# Patient Record
Sex: FEMALE | Race: BLACK OR AFRICAN AMERICAN | NOT HISPANIC OR LATINO
[De-identification: names, ages, dates, MRNs, and addresses within clinical notes are randomized per-mention and may not be internally consistent; named-entity substitution may affect disease eponyms.]

---

## 2019-06-03 ENCOUNTER — APPOINTMENT (OUTPATIENT)
Dept: SURGERY | Facility: CLINIC | Age: 59
End: 2019-06-03
Payer: COMMERCIAL

## 2019-06-03 VITALS
SYSTOLIC BLOOD PRESSURE: 137 MMHG | HEART RATE: 72 BPM | BODY MASS INDEX: 27.72 KG/M2 | HEIGHT: 71 IN | TEMPERATURE: 98.3 F | DIASTOLIC BLOOD PRESSURE: 87 MMHG | WEIGHT: 198 LBS

## 2019-06-03 DIAGNOSIS — Z83.3 FAMILY HISTORY OF DIABETES MELLITUS: ICD-10-CM

## 2019-06-03 DIAGNOSIS — Z78.9 OTHER SPECIFIED HEALTH STATUS: ICD-10-CM

## 2019-06-03 DIAGNOSIS — Z82.49 FAMILY HISTORY OF ISCHEMIC HEART DISEASE AND OTHER DISEASES OF THE CIRCULATORY SYSTEM: ICD-10-CM

## 2019-06-03 DIAGNOSIS — Z86.79 PERSONAL HISTORY OF OTHER DISEASES OF THE CIRCULATORY SYSTEM: ICD-10-CM

## 2019-06-03 DIAGNOSIS — N63.20 UNSPECIFIED LUMP IN THE LEFT BREAST, UNSPECIFIED QUADRANT: ICD-10-CM

## 2019-06-03 PROCEDURE — 99203 OFFICE O/P NEW LOW 30 MIN: CPT | Mod: 25

## 2019-06-03 PROCEDURE — 10021 FNA BX W/O IMG GDN 1ST LES: CPT

## 2019-06-03 NOTE — PHYSICAL EXAM
[No Rash or Lesion] : No rash or lesion [Normal Rate and Rhythm] : normal rate and rhythm [Normal Breath Sounds] : Normal breath sounds [Oriented to Person] : oriented to person [Alert] : alert [Oriented to Place] : oriented to place [Calm] : calm [Oriented to Time] : oriented to time [de-identified] : A/Ox3; NAD [de-identified] : EOMI [de-identified] : supple, no JVD [de-identified] : has a cystic mass to the L. breast, non tender, no nipple discharge, no masses or lumps felt to R. breast, no axillary adenopathy  [de-identified] : abd is soft, NT/ND [de-identified] : no LE Edema ; no erythema  [de-identified] : h

## 2019-06-03 NOTE — DATA REVIEWED
[FreeTextEntry1] : Patient: KHANH NANCE\par YOB: 1960\par Phone: [phone]\par MRN: [id number] Acc: [id number]\par Date of Exam: 10-\par  \par EXAM:  DIGITAL UNILATERAL LEFT DIAGNOSTIC MAMMOGRAM WITH CAD AND BREAST ULTRASOUND\par \par HISTORY:  The patient is 58 years old and is seen for evaluation of nodule upper outer left breast. There is no personal history of breast cancer. Family history of breast cancer: None.\par \par CLINICAL BREAST EXAMINATION:  The patient reports CBE was more than one year ago.\par \par COMPARISON:  The present examination has been compared to prior breast imaging studies dating back to 9/22/2000\par \par MAMMOGRAM:\par \par TECHNIQUE:  Full-field digital mammography of the left breast was obtained. Spot compression view left breast were obtained in CC and MLO projection. Straight lateral view and exaggerated CC view left breast were also obtained. Computer-assisted detection (CAD) was utilized. \par \par FINDINGS:\par BREAST COMPOSITION:  The breasts are heterogeneously dense, which may obscure small masses.\par \par There is a 2.6 cm round hyperdense nodule in the anterior upper outer left breast. There is an adjacent biopsy marker. The focal asymmetry in the left retroareolar region effaces on spot compression.    \par \par ULTRASOUND:  \par \par TECHNIQUE:  A left breast ultrasound was performed limited to the area of interest.\par \par FINDINGS:  In the left breast at 1 o'clock 4 cm from the nipple there is a large 2.5 x 2.4 x 1.8 cm anechoic nodule consistent with a cyst. There were no nodule seen in the left retroareolar region sonographically.\par \par IMPRESSION: 2.6 cm round hypodense nodule seen in the anterior upper outer left breast. Ultrasound shows a corresponding 2.5 cm anechoic nodule consistent with a cyst.   \par \par FOLLOW-UP:  Clinical correlation and assessment.\par Any suspicious palpable abnormality should be managed based on clinical grounds with surgical consultation. Otherwise follow-up in 1 year recommended\par ASSESSMENT:  BI-RADS Category 2:  Benign.\par \par \par \par \par As per the FDA requirements, a layman's letter has been generated and sent to your patient stating the results and recommendations of this breast imaging study. We have entered your patient into our reminder system and will notify them when they are due for their next breast imaging exam. \par Thank you for the opportunity to participate in the care of this patient.  \par  \par Farrukh High MD  - Electronically Signed: 10- 12:49 PM \par Physician to Physician Direct Line is: [phone]\par

## 2019-06-03 NOTE — HISTORY OF PRESENT ILLNESS
[de-identified] : 58 y.o F referred for consultation visit, she c/o having a L. breast nodule. Patient had mammo and breast US 10/31/18 ; results c/w 2.6 cm round hypodense nodule seen in the anterior upper outer left breast. Ultrasound shows a corresponding 2.5 cm anechoic nodule consistent with a cyst.  BI RADS cat 2. \par Patient reports feeling a lump on her left breast, denies pain to the area; she states she's felt the lump for years.  [de-identified] : Under [facility] Fine needle aspiration of the left upper breast cystic mass was done. Tolearted th eprocedure well\par No residual mass

## 2019-06-03 NOTE — CONSULT LETTER
[Dear  ___] : Dear  [unfilled], [Consult Letter:] : I had the pleasure of evaluating your patient, [unfilled]. [Consult Closing:] : Thank you very much for allowing me to participate in the care of this patient.  If you have any questions, please do not hesitate to contact me. [Sincerely,] : Sincerely, [FreeTextEntry3] : Venancio Jorgensen MD\par

## 2019-06-03 NOTE — REVIEW OF SYSTEMS
[Fever] : no fever [Chills] : no chills [Feeling Poorly] : not feeling poorly [Eyesight Problems] : no eyesight problems [Nosebleeds] : no nosebleeds [Chest Pain] : no chest pain [Shortness Of Breath] : no shortness of breath [Wheezing] : no wheezing [Abdominal Pain] : no abdominal pain [Joint Swelling] : no joint swelling [Pelvic Pain] : no pelvic pain [Breast Pain] : no breast pain [Joint Stiffness] : no joint stiffness [Anxiety] : no anxiety [Dizziness] : no dizziness [Muscle Weakness] : no muscle weakness [Swollen Glands] : no swollen glands [de-identified] : has a lump to the L. breast , for years

## 2019-06-03 NOTE — PLAN
[FreeTextEntry1] : Under [facility] fine needle aspiration bx was done for the left breast cystic mass in the left upper outer quadrant\par Tolerated the procedure well\par No residual mass felt\par Mammogram and breast Us was reviewed

## 2019-06-05 PROBLEM — Z82.49 FAMILY HISTORY OF HYPERTENSION: Status: ACTIVE | Noted: 2019-06-03

## 2019-06-05 PROBLEM — Z83.3 FAMILY HISTORY OF DIABETES MELLITUS: Status: ACTIVE | Noted: 2019-06-03

## 2019-06-05 PROBLEM — Z78.9 NO PERTINENT PAST SURGICAL HISTORY: Status: RESOLVED | Noted: 2019-06-03 | Resolved: 2019-06-05

## 2019-06-05 PROBLEM — Z86.79 HISTORY OF ANEURYSM: Status: RESOLVED | Noted: 2019-06-03 | Resolved: 2019-06-05

## 2019-06-05 PROBLEM — Z78.9 NON-SMOKER: Status: ACTIVE | Noted: 2019-06-03

## 2019-06-07 LAB — FNA, BREAST: NORMAL

## 2019-06-10 ENCOUNTER — APPOINTMENT (OUTPATIENT)
Dept: SURGERY | Facility: CLINIC | Age: 59
End: 2019-06-10

## 2020-04-25 ENCOUNTER — MESSAGE (OUTPATIENT)
Age: 60
End: 2020-04-25